# Patient Record
(demographics unavailable — no encounter records)

---

## 2025-02-03 NOTE — REVIEW OF SYSTEMS
[Fever] : fever [Chills] : chills [Malaise] : malaise [Appetite Changes] : appetite changes [Headache] : no headache [Ear Pain] : no ear pain [Sore Throat] : no sore throat [Tachypnea] : not tachypneic [Wheezing] : no wheezing [Cough] : no cough [Vomiting] : no vomiting [Diarrhea] : no diarrhea [Rash] : no rash

## 2025-02-03 NOTE — PHYSICAL EXAM
[Pink Nasal Mucosa] : pink nasal mucosa [Clear to Auscultation Bilaterally] : clear to auscultation bilaterally [NL] : warm, clear [Warm] : warm [Clear] : clear [Erythematous Oropharynx] : nonerythematous oropharynx

## 2025-02-03 NOTE — DISCUSSION/SUMMARY
[FreeTextEntry1] : flulike symptoms otherwise unremarkable exam strep negative 4 plex PCR + flu A, Flu B and RSV  will start Tamiflu for treatment  hydration/rest

## 2025-03-28 NOTE — HISTORY OF PRESENT ILLNESS
[de-identified] : congestion and cough for a few days but tends to have seasonal allergies, mom started zyrtec.  c/o ear pain yesterday